# Patient Record
Sex: MALE | Race: OTHER | HISPANIC OR LATINO | URBAN - METROPOLITAN AREA
[De-identification: names, ages, dates, MRNs, and addresses within clinical notes are randomized per-mention and may not be internally consistent; named-entity substitution may affect disease eponyms.]

---

## 2018-08-01 ENCOUNTER — OUTPATIENT (OUTPATIENT)
Dept: OUTPATIENT SERVICES | Facility: HOSPITAL | Age: 63
LOS: 1 days | End: 2018-08-01
Payer: MEDICAID

## 2018-08-01 PROCEDURE — G9001: CPT

## 2018-08-16 DIAGNOSIS — Z71.89 OTHER SPECIFIED COUNSELING: ICD-10-CM

## 2018-11-06 ENCOUNTER — EMERGENCY (EMERGENCY)
Facility: HOSPITAL | Age: 63
LOS: 1 days | Discharge: HOME | End: 2018-11-06
Attending: EMERGENCY MEDICINE
Payer: MEDICAID

## 2018-11-06 VITALS
RESPIRATION RATE: 18 BRPM | OXYGEN SATURATION: 97 % | SYSTOLIC BLOOD PRESSURE: 122 MMHG | HEART RATE: 90 BPM | DIASTOLIC BLOOD PRESSURE: 71 MMHG

## 2018-11-06 VITALS — WEIGHT: 169.09 LBS

## 2018-11-06 LAB
ALBUMIN SERPL ELPH-MCNC: 3.4 G/DL — SIGNIFICANT CHANGE UP (ref 3.3–5.2)
ALP SERPL-CCNC: 60 U/L — SIGNIFICANT CHANGE UP (ref 40–120)
ALT FLD-CCNC: 10 U/L — SIGNIFICANT CHANGE UP
ANION GAP SERPL CALC-SCNC: 14 MMOL/L — SIGNIFICANT CHANGE UP (ref 5–17)
APPEARANCE UR: CLEAR — SIGNIFICANT CHANGE UP
AST SERPL-CCNC: 13 U/L — SIGNIFICANT CHANGE UP
BASOPHILS # BLD AUTO: 0 K/UL — SIGNIFICANT CHANGE UP (ref 0–0.2)
BASOPHILS NFR BLD AUTO: 0.2 % — SIGNIFICANT CHANGE UP (ref 0–2)
BILIRUB SERPL-MCNC: 0.3 MG/DL — LOW (ref 0.4–2)
BILIRUB UR-MCNC: NEGATIVE — SIGNIFICANT CHANGE UP
BUN SERPL-MCNC: 12 MG/DL — SIGNIFICANT CHANGE UP (ref 8–20)
CALCIUM SERPL-MCNC: 9.5 MG/DL — SIGNIFICANT CHANGE UP (ref 8.6–10.2)
CHLORIDE SERPL-SCNC: 98 MMOL/L — SIGNIFICANT CHANGE UP (ref 98–107)
CO2 SERPL-SCNC: 26 MMOL/L — SIGNIFICANT CHANGE UP (ref 22–29)
COLOR SPEC: YELLOW — SIGNIFICANT CHANGE UP
CREAT SERPL-MCNC: 0.72 MG/DL — SIGNIFICANT CHANGE UP (ref 0.5–1.3)
DIFF PNL FLD: NEGATIVE — SIGNIFICANT CHANGE UP
EOSINOPHIL # BLD AUTO: 0.2 K/UL — SIGNIFICANT CHANGE UP (ref 0–0.5)
EOSINOPHIL NFR BLD AUTO: 1.3 % — SIGNIFICANT CHANGE UP (ref 0–5)
GLUCOSE SERPL-MCNC: 222 MG/DL — HIGH (ref 70–115)
GLUCOSE UR QL: NEGATIVE MG/DL — SIGNIFICANT CHANGE UP
HCT VFR BLD CALC: 34 % — LOW (ref 42–52)
HGB BLD-MCNC: 11.6 G/DL — LOW (ref 14–18)
KETONES UR-MCNC: NEGATIVE — SIGNIFICANT CHANGE UP
LACTATE BLDV-MCNC: 1.9 MMOL/L — SIGNIFICANT CHANGE UP (ref 0.5–2)
LACTATE BLDV-MCNC: 2.1 MMOL/L — HIGH (ref 0.5–2)
LEUKOCYTE ESTERASE UR-ACNC: ABNORMAL
LYMPHOCYTES # BLD AUTO: 19.6 % — LOW (ref 20–55)
LYMPHOCYTES # BLD AUTO: 2.4 K/UL — SIGNIFICANT CHANGE UP (ref 1–4.8)
MCHC RBC-ENTMCNC: 25.8 PG — LOW (ref 27–31)
MCHC RBC-ENTMCNC: 34.1 G/DL — SIGNIFICANT CHANGE UP (ref 32–36)
MCV RBC AUTO: 75.7 FL — LOW (ref 80–94)
MONOCYTES # BLD AUTO: 1.1 K/UL — HIGH (ref 0–0.8)
MONOCYTES NFR BLD AUTO: 8.9 % — SIGNIFICANT CHANGE UP (ref 3–10)
NEUTROPHILS # BLD AUTO: 8.4 K/UL — HIGH (ref 1.8–8)
NEUTROPHILS NFR BLD AUTO: 68.3 % — SIGNIFICANT CHANGE UP (ref 37–73)
NITRITE UR-MCNC: NEGATIVE — SIGNIFICANT CHANGE UP
PH UR: 8 — SIGNIFICANT CHANGE UP (ref 5–8)
PLATELET # BLD AUTO: 404 K/UL — HIGH (ref 150–400)
POTASSIUM SERPL-MCNC: 4.7 MMOL/L — SIGNIFICANT CHANGE UP (ref 3.5–5.3)
POTASSIUM SERPL-SCNC: 4.7 MMOL/L — SIGNIFICANT CHANGE UP (ref 3.5–5.3)
PROT SERPL-MCNC: 7.1 G/DL — SIGNIFICANT CHANGE UP (ref 6.6–8.7)
PROT UR-MCNC: NEGATIVE MG/DL — SIGNIFICANT CHANGE UP
RBC # BLD: 4.49 M/UL — LOW (ref 4.6–6.2)
RBC # FLD: 15 % — SIGNIFICANT CHANGE UP (ref 11–15.6)
SODIUM SERPL-SCNC: 138 MMOL/L — SIGNIFICANT CHANGE UP (ref 135–145)
SP GR SPEC: 1.01 — SIGNIFICANT CHANGE UP (ref 1.01–1.02)
UROBILINOGEN FLD QL: 4 MG/DL
WBC # BLD: 12.3 K/UL — HIGH (ref 4.8–10.8)
WBC # FLD AUTO: 12.3 K/UL — HIGH (ref 4.8–10.8)

## 2018-11-06 PROCEDURE — 93005 ELECTROCARDIOGRAM TRACING: CPT

## 2018-11-06 PROCEDURE — 87040 BLOOD CULTURE FOR BACTERIA: CPT

## 2018-11-06 PROCEDURE — 36415 COLL VENOUS BLD VENIPUNCTURE: CPT

## 2018-11-06 PROCEDURE — 82550 ASSAY OF CK (CPK): CPT

## 2018-11-06 PROCEDURE — 93010 ELECTROCARDIOGRAM REPORT: CPT

## 2018-11-06 PROCEDURE — 84100 ASSAY OF PHOSPHORUS: CPT

## 2018-11-06 PROCEDURE — 86664 EPSTEIN-BARR NUCLEAR ANTIGEN: CPT

## 2018-11-06 PROCEDURE — 99283 EMERGENCY DEPT VISIT LOW MDM: CPT

## 2018-11-06 PROCEDURE — 86665 EPSTEIN-BARR CAPSID VCA: CPT

## 2018-11-06 PROCEDURE — 71046 X-RAY EXAM CHEST 2 VIEWS: CPT

## 2018-11-06 PROCEDURE — 85027 COMPLETE CBC AUTOMATED: CPT

## 2018-11-06 PROCEDURE — 86308 HETEROPHILE ANTIBODY SCREEN: CPT

## 2018-11-06 PROCEDURE — 86663 EPSTEIN-BARR ANTIBODY: CPT

## 2018-11-06 PROCEDURE — 83605 ASSAY OF LACTIC ACID: CPT

## 2018-11-06 PROCEDURE — 99285 EMERGENCY DEPT VISIT HI MDM: CPT

## 2018-11-06 PROCEDURE — 86618 LYME DISEASE ANTIBODY: CPT

## 2018-11-06 PROCEDURE — 81001 URINALYSIS AUTO W/SCOPE: CPT

## 2018-11-06 PROCEDURE — 71046 X-RAY EXAM CHEST 2 VIEWS: CPT | Mod: 26

## 2018-11-06 PROCEDURE — 87521 HEPATITIS C PROBE&RVRS TRNSC: CPT

## 2018-11-06 PROCEDURE — 80074 ACUTE HEPATITIS PANEL: CPT

## 2018-11-06 PROCEDURE — 83735 ASSAY OF MAGNESIUM: CPT

## 2018-11-06 PROCEDURE — 87086 URINE CULTURE/COLONY COUNT: CPT

## 2018-11-06 PROCEDURE — 80053 COMPREHEN METABOLIC PANEL: CPT

## 2018-11-06 PROCEDURE — 99284 EMERGENCY DEPT VISIT MOD MDM: CPT

## 2018-11-06 RX ORDER — METRONIDAZOLE 500 MG
1 TABLET ORAL
Qty: 30 | Refills: 0 | OUTPATIENT
Start: 2018-11-06 | End: 2018-11-15

## 2018-11-06 RX ORDER — SODIUM CHLORIDE 9 MG/ML
1000 INJECTION INTRAMUSCULAR; INTRAVENOUS; SUBCUTANEOUS ONCE
Qty: 0 | Refills: 0 | Status: COMPLETED | OUTPATIENT
Start: 2018-11-06 | End: 2018-11-06

## 2018-11-06 RX ORDER — MOXIFLOXACIN HYDROCHLORIDE TABLETS, 400 MG 400 MG/1
1 TABLET, FILM COATED ORAL
Qty: 20 | Refills: 0 | OUTPATIENT
Start: 2018-11-06 | End: 2018-11-15

## 2018-11-06 RX ADMIN — SODIUM CHLORIDE 1000 MILLILITER(S): 9 INJECTION INTRAMUSCULAR; INTRAVENOUS; SUBCUTANEOUS at 11:57

## 2018-11-06 RX ADMIN — SODIUM CHLORIDE 4000 MILLILITER(S): 9 INJECTION INTRAMUSCULAR; INTRAVENOUS; SUBCUTANEOUS at 14:29

## 2018-11-06 NOTE — ED ADULT NURSE REASSESSMENT NOTE - NS ED NURSE REASSESS COMMENT FT1
Pt remains a&ox3 resting comfortably with VSS, family at bedside, no acute s/s of respiratory distress noted or reported, pending stool sample collection, will continue to monitor

## 2018-11-06 NOTE — ED PROVIDER NOTE - NS ED ROS FT
ROS: no CP/SOB. no cough. no fever. no n/v/d/c. +abd pain. no rash. no bleeding. no urinary complaints. +weakness. no vision changes. no HA. no neck/back pain. no extremity swelling/deformity. No change in mental status.

## 2018-11-06 NOTE — ED ADULT NURSE NOTE - OBJECTIVE STATEMENT
pt reports diarrhea x 1 month, was away in [facility] a month ago and has been feeling this way ever since. states he drank the water there and thinks its from that. pt AOX3, no distress, no fevers. denies vomiting. no abd pain. no appetite change.

## 2018-11-06 NOTE — ED PROVIDER NOTE - PLAN OF CARE
1. Return to ED for worsening, progressive or any other concerning symptoms   2. Follow up with your primary care doctor in 2-3days   3. Take 500mg of Ciprofloxacin twice a day for 10days. Do not participate in any high impact activities while on this medication   4. Take 500mg of Metronidazole three times a day for 10days. Do not drink alcohol while on this medication

## 2018-11-06 NOTE — ED PROVIDER NOTE - OBJECTIVE STATEMENT
62yo M with return from [place] 1 month ago, approx 1 week after returning some 'loose stool' was started on flagyl and completed course. since 3 weeks ago severe generalized weakness and myalgias. no rash. some 'stomach upset' but denies any abd pain. no vomiting. no HA/neck stiffness. +chills and sweats at night, a 'light fever' only at night. denies bug bites during travel. wife with him there not sick. planned for travel to [place] tomorrow, took laxative last week to 'clear out boweels' had normal colored stool and had 'bugs' moving in the toilet. no worms. no CP/SOB. no syncope/presyncope. also feels b/l LE swollen.

## 2018-11-06 NOTE — ED PROVIDER NOTE - MEDICAL DECISION MAKING DETAILS
patient with myalgias, possible fevers at night and fatigue since return from [place] 3-4 weeks, hd loose stool previously then resolved. now with 1 episode loose stool after laxative with 'bugs' patient showed video there are fluttering movements/circular unable to distinguish a describe entity, no worms. exam unremarkable except for tachycradia on minimal exertion. will check labs. hepatits. ebv/cmv. stool studies even though those collected on 10/30 negative. possible viral illness. typhoid. malaria. will send thick/thin smear and possible ID consult patient with myalgias, possible fevers at night and fatigue since return from [place] 3-4 weeks, hd loose stool previously then resolved. now with 1 episode loose stool after laxative with 'bugs' patient showed video there are fluttering movements/circular unable to distinguish a describe entity, no worms. exam unremarkable except for tachycradia on minimal exertion. will check labs. hepatits. ebv/cmv. stool studies even though those collected on 10/30 negative. possible viral illness. typhoid. malaria. will send thick/thin smear and possible ID consult. had mentioend LE swelling- no swelling on exam, no CP/SOB no concern for DVTY/PE

## 2018-11-06 NOTE — ED STATDOCS - PROGRESS NOTE DETAILS
patient re-evaluated c/o b/l leg swelling, recently traveled from [place], for 1 month, not feeling well, found to have tachycardia in office and sent for outpatient labs and stool studies, treated with Flagyl for suspected parasite infection and diarhea, CXR shows no e/o CHF, patient only pmhx DM, EKG shows tachycardia with PVCs, PE +b/l leg swelling, upgraded to MAIN for cardiac monitor, case d/w MD Bowden for further evaluation

## 2018-11-06 NOTE — ED PROVIDER NOTE - CARE PLAN
Principal Discharge DX:	Myalgia  Assessment and plan of treatment:	1. Return to ED for worsening, progressive or any other concerning symptoms   2. Follow up with your primary care doctor in 2-3days   3. Take 500mg of Ciprofloxacin twice a day for 10days. Do not participate in any high impact activities while on this medication   4. Take 500mg of Metronidazole three times a day for 10days. Do not drink alcohol while on this medication  Secondary Diagnosis:	Chills

## 2018-11-06 NOTE — ED PROVIDER NOTE - NOTES
Dr Hadley, recommend C/F for 10 days and Follow up outpt. no concerning exam findings. afebrile. diarrhea resolved but with possible mobile agents in toilet will tx again.

## 2018-11-06 NOTE — CONSULT NOTE ADULT - ASSESSMENT
64y/o man with HTN and DM came to ED with generalized body aches for few days and intermittent loose BM and constipation.   Doesn't appear sick, no fever, mild leukocytosis. he could have mild colitis or parasitic infection. Leaving for [place] tomorrow and coming back on 11/16.   Advised him to see a GI for possible colonoscopy and more investigation. Reportedly had a neg O&P but needs to be repeated.   Will give him 10days of cirpo 500mg q12h and metronidazole 500mg q8h. If problem continues will see me in the office for more work up.  At any time if feels worse with new symptoms or fever will go to ED.   Discussed with the patient and ED attending.

## 2018-11-06 NOTE — ED PROVIDER NOTE - PHYSICAL EXAMINATION
Gen: NAD, AOx3  Head: NCAT  HEENT: PERRL, EOMI, oral mucosa moist, normal conjunctiva, neck supple  Lung: CTAB, no respiratory distress  CV: rrr, no murmur, Normal perfusion  Abd: soft, NTND  MSK: No edema, no visible deformities  Neuro: No focal neurologic deficits  Skin: No rash   Psych: normal affect

## 2018-11-06 NOTE — ED ADULT TRIAGE NOTE - CHIEF COMPLAINT QUOTE
"I went on vacation in September and since then I have been weak since with diarrhea. I saw the doctor and was on medication, I saw them today and they told me to come here for parasites. " Pt A & OX4. has papers from [facility].

## 2018-11-06 NOTE — ED PROVIDER NOTE - PROGRESS NOTE DETAILS
mild leukocytosis, thick/thin smear no parasites visualized. will need likely doxy vs cipro will Follow up ID consult -Kal DO

## 2018-11-07 LAB
B BURGDOR C6 AB SER-ACNC: NEGATIVE — SIGNIFICANT CHANGE UP
B BURGDOR IGG+IGM SER-ACNC: 0.09 INDEX — SIGNIFICANT CHANGE UP (ref 0.01–0.89)
CULTURE RESULTS: SIGNIFICANT CHANGE UP
EBV EA AB SER IA-ACNC: <5 U/ML — SIGNIFICANT CHANGE UP
EBV EA AB TITR SER IF: POSITIVE
EBV EA IGG SER-ACNC: NEGATIVE — SIGNIFICANT CHANGE UP
EBV NA IGG SER IA-ACNC: 29.7 U/ML — HIGH
EBV PATRN SPEC IB-IMP: SIGNIFICANT CHANGE UP
EBV VCA IGG AVIDITY SER QL IA: POSITIVE
EBV VCA IGM SER IA-ACNC: 225 U/ML — HIGH
EBV VCA IGM SER IA-ACNC: <10 U/ML — SIGNIFICANT CHANGE UP
EBV VCA IGM TITR FLD: NEGATIVE — SIGNIFICANT CHANGE UP
HAV IGM SER-ACNC: SIGNIFICANT CHANGE UP
HBV CORE IGM SER-ACNC: SIGNIFICANT CHANGE UP
HBV SURFACE AG SER-ACNC: SIGNIFICANT CHANGE UP
HCV AB S/CO SERPL IA: 6.22 S/CO — SIGNIFICANT CHANGE UP
HCV AB SERPL-IMP: REACTIVE
HCV RNA FLD QL NAA+PROBE: SIGNIFICANT CHANGE UP
HCV RNA SPEC QL PROBE+SIG AMP: SIGNIFICANT CHANGE UP
SPECIMEN SOURCE: SIGNIFICANT CHANGE UP

## 2018-11-09 RX ORDER — LOSARTAN POTASSIUM 100 MG/1
0 TABLET, FILM COATED ORAL
Qty: 0 | Refills: 0 | COMMUNITY

## 2018-11-09 RX ORDER — ASPIRIN/CALCIUM CARB/MAGNESIUM 324 MG
1 TABLET ORAL
Qty: 0 | Refills: 0 | COMMUNITY

## 2018-11-09 RX ORDER — METFORMIN HYDROCHLORIDE 850 MG/1
0 TABLET ORAL
Qty: 0 | Refills: 0 | COMMUNITY

## 2018-11-11 LAB
CULTURE RESULTS: SIGNIFICANT CHANGE UP
CULTURE RESULTS: SIGNIFICANT CHANGE UP
SPECIMEN SOURCE: SIGNIFICANT CHANGE UP
SPECIMEN SOURCE: SIGNIFICANT CHANGE UP

## 2019-04-19 PROBLEM — I10 ESSENTIAL (PRIMARY) HYPERTENSION: Chronic | Status: ACTIVE | Noted: 2018-11-06

## 2019-04-19 PROBLEM — E11.9 TYPE 2 DIABETES MELLITUS WITHOUT COMPLICATIONS: Chronic | Status: ACTIVE | Noted: 2018-11-06

## 2019-08-30 ENCOUNTER — APPOINTMENT (OUTPATIENT)
Dept: FAMILY MEDICINE | Facility: CLINIC | Age: 64
End: 2019-08-30

## 2021-04-08 NOTE — ED STATDOCS - OBJECTIVE STATEMENT
Schedule Procedure:   Please Schedule October 2021  Procedure: Colonoscopy (40444) with NuLytely  Diagnosis: Personal History Colon Cancer Z85.038  Is patient:    Diabetic? Yes: Hold oral medications day of procedure   On Coumadin, heparin, lovenox? Yes: Coumadin Discuss with Prescribing Provider.   On ASA/NSAIDS? Platelet Modifying (examples - Plavix, aspirin, nsaids, Aggrenox)? No  Latex allergy: No  Sleep apnea: No  [facility]  Special Instructions:   MAC Anesthesia        Dr. Snell sent reminder for colonoscopy due in October 2021.  Per Dr. Crespo's note above, patient will decide if he wants to pursue this.  COVID test not ordered yet.  Will need to get clearance to hold Coumadin as well.           Telemedicine assessment was conducted (using real time 2 way audio-video technology) by Dr. Gaurav Ardon located at [address] [place] [id number]  ++++++++++++++++++++++++  Pertinent patient history and initial plan:   64 y/o M pt presents to ED c/o weakness, leg swelling, and body aches x 6 weeks. Went to [place] came back September 24th. Two days later, pt began having body aches and leg swelling. He states he took a laxative 4 days ago and had BM and saw something move in the toilet. He believes it was a parasite. Pt was given antibiotics the week he returned for diarrhea. Denies vomiting. Pt has plans to go to [place] tomorrow. Telemedicine assessment was conducted (using real time 2 way audio-video technology) by Dr. Gaurav Ardon located at [address] [place] [id number]  ++++++++++++++++++++++++  Pertinent patient history and initial plan:   62 y/o M pt presents to ED c/o weakness, leg swelling, and body aches x 6 weeks. Went to [place] came back September 24th. Two days later, pt began having body aches and leg swelling. He states he took a laxative 4 days ago and had BM and saw something move in the toilet. He believes it was a parasite. Pt was given antibiotics the week he returned for diarrhea. Denies vomiting. Pt has plans to go to [place] tomorrow.  patient went to pmd Dr. Macias today and was referred to ED  patient had stool culture and O&P performed by pmd - and has results which are negative  patient reports he is starting to feel better  denies chest pain or shortness of breath  denies abdominal pain  +edema to b/l feet x 1 week    will recheck cbc, lytes, lft  cxr    Patient seen by me in intake for initial assessment and ordering. Physician on site to follow results and further evaluate and treat patient.

## 2021-10-08 ENCOUNTER — TRANSCRIPTION ENCOUNTER (OUTPATIENT)
Age: 66
End: 2021-10-08

## 2022-11-10 ENCOUNTER — OFFICE (OUTPATIENT)
Dept: URBAN - METROPOLITAN AREA CLINIC 112 | Facility: CLINIC | Age: 67
Setting detail: OPHTHALMOLOGY
End: 2022-11-10
Payer: COMMERCIAL

## 2022-11-10 DIAGNOSIS — H52.4: ICD-10-CM

## 2022-11-10 DIAGNOSIS — H25.12: ICD-10-CM

## 2022-11-10 DIAGNOSIS — H40.1131: ICD-10-CM

## 2022-11-10 DIAGNOSIS — E11.3393: ICD-10-CM

## 2022-11-10 PROCEDURE — 92014 COMPRE OPH EXAM EST PT 1/>: CPT | Performed by: OPHTHALMOLOGY

## 2022-11-10 PROCEDURE — 92015 DETERMINE REFRACTIVE STATE: CPT | Performed by: OPHTHALMOLOGY

## 2022-11-10 PROCEDURE — 92083 EXTENDED VISUAL FIELD XM: CPT | Performed by: OPHTHALMOLOGY

## 2022-11-10 PROCEDURE — 92133 CPTRZD OPH DX IMG PST SGM ON: CPT | Performed by: OPHTHALMOLOGY

## 2022-11-10 ASSESSMENT — TONOMETRY
OS_IOP_MMHG: 15
OS_IOP_MMHG: 16
OD_IOP_MMHG: 16
OD_IOP_MMHG: 12

## 2022-11-10 ASSESSMENT — REFRACTION_MANIFEST
OS_SPHERE: +0.75
OD_VA1: 20/25
OS_AXIS: 050
OD_SPHERE: +1.00
OD_ADD: +2.75
OS_CYLINDER: -1.00
OD_AXIS: 041
OU_VA: 20/20
OS_VA1: 20/20
OS_VA2: 20/20
OD_VA2: 20/20
OS_ADD: +2.75
OD_CYLINDER: -0.25

## 2022-11-10 ASSESSMENT — REFRACTION_AUTOREFRACTION
OS_SPHERE: +0.75
OS_CYLINDER: -1.25
OS_AXIS: 053
OD_SPHERE: +2.50
OD_CYLINDER: -0.25
OD_AXIS: 041

## 2022-11-10 ASSESSMENT — AXIALLENGTH_DERIVED
OS_AL: 23.1831
OD_AL: 23.8977
OD_AL: 23.3127
OS_AL: 23.1362

## 2022-11-10 ASSESSMENT — PACHYMETRY
OD_CT_UM: 500
OS_CT_UM: 534
OD_CT_CORRECTION: 3
OS_CT_CORRECTION: 1

## 2022-11-10 ASSESSMENT — SPHEQUIV_DERIVED
OD_SPHEQUIV: 2.375
OS_SPHEQUIV: 0.25
OD_SPHEQUIV: 0.875
OS_SPHEQUIV: 0.125

## 2022-11-10 ASSESSMENT — LID POSITION - PTOSIS
OD_PTOSIS: ABSENT
OS_PTOSIS: ABSENT

## 2022-11-10 ASSESSMENT — KERATOMETRY
OD_AXISANGLE_DEGREES: 050
OS_K2POWER_DIOPTERS: 44.75
METHOD_AUTO_MANUAL: AUTO
OS_AXISANGLE_DEGREES: 135
OD_K2POWER_DIOPTERS: 42.00
OD_K1POWER_DIOPTERS: 41.50
OS_K1POWER_DIOPTERS: 44.25

## 2022-11-10 ASSESSMENT — CONFRONTATIONAL VISUAL FIELD TEST (CVF)
OS_FINDINGS: FULL
OD_FINDINGS: FULL

## 2022-11-10 ASSESSMENT — CORNEAL SURGICAL SCARRING: OD_SCARRING: ANTERIOR

## 2022-11-10 ASSESSMENT — VISUAL ACUITY
OD_BCVA: 20/25-1
OS_BCVA: 20/25-1

## 2023-01-03 ENCOUNTER — OFFICE (OUTPATIENT)
Dept: URBAN - METROPOLITAN AREA CLINIC 94 | Facility: CLINIC | Age: 68
Setting detail: OPHTHALMOLOGY
End: 2023-01-03
Payer: COMMERCIAL

## 2023-01-03 DIAGNOSIS — E11.3393: ICD-10-CM

## 2023-01-03 DIAGNOSIS — H43.813: ICD-10-CM

## 2023-01-03 DIAGNOSIS — H35.033: ICD-10-CM

## 2023-01-03 PROCEDURE — 92134 CPTRZ OPH DX IMG PST SGM RTA: CPT | Performed by: OPHTHALMOLOGY

## 2023-01-03 PROCEDURE — 92014 COMPRE OPH EXAM EST PT 1/>: CPT | Performed by: OPHTHALMOLOGY

## 2023-01-03 PROCEDURE — 92235 FLUORESCEIN ANGRPH MLTIFRAME: CPT | Performed by: OPHTHALMOLOGY

## 2023-01-03 ASSESSMENT — AXIALLENGTH_DERIVED
OD_AL: 23.3127
OS_AL: 23.1831

## 2023-01-03 ASSESSMENT — PACHYMETRY
OS_CT_UM: 534
OD_CT_UM: 500
OD_CT_CORRECTION: 3
OS_CT_CORRECTION: 1

## 2023-01-03 ASSESSMENT — KERATOMETRY
OD_K2POWER_DIOPTERS: 42.00
OS_AXISANGLE_DEGREES: 135
METHOD_AUTO_MANUAL: AUTO
OS_K1POWER_DIOPTERS: 44.25
OD_K1POWER_DIOPTERS: 41.50
OD_AXISANGLE_DEGREES: 050
OS_K2POWER_DIOPTERS: 44.75

## 2023-01-03 ASSESSMENT — REFRACTION_AUTOREFRACTION
OS_CYLINDER: -1.25
OD_AXIS: 041
OD_SPHERE: +2.50
OS_SPHERE: +0.75
OD_CYLINDER: -0.25
OS_AXIS: 053

## 2023-01-03 ASSESSMENT — LID POSITION - PTOSIS
OS_PTOSIS: ABSENT
OD_PTOSIS: ABSENT

## 2023-01-03 ASSESSMENT — TONOMETRY
OS_IOP_MMHG: 16
OD_IOP_MMHG: 11

## 2023-01-03 ASSESSMENT — VISUAL ACUITY
OD_BCVA: 20/25-1
OS_BCVA: 20/30

## 2023-01-03 ASSESSMENT — SPHEQUIV_DERIVED
OS_SPHEQUIV: 0.125
OD_SPHEQUIV: 2.375

## 2023-01-03 ASSESSMENT — CORNEAL SURGICAL SCARRING: OD_SCARRING: ANTERIOR

## 2023-01-03 ASSESSMENT — CONFRONTATIONAL VISUAL FIELD TEST (CVF)
OS_FINDINGS: FULL
OD_FINDINGS: FULL

## 2023-06-22 ENCOUNTER — OFFICE (OUTPATIENT)
Dept: URBAN - METROPOLITAN AREA CLINIC 112 | Facility: CLINIC | Age: 68
Setting detail: OPHTHALMOLOGY
End: 2023-06-22
Payer: MEDICARE

## 2023-06-22 DIAGNOSIS — H43.813: ICD-10-CM

## 2023-06-22 DIAGNOSIS — E11.3393: ICD-10-CM

## 2023-06-22 DIAGNOSIS — H04.123: ICD-10-CM

## 2023-06-22 DIAGNOSIS — H25.12: ICD-10-CM

## 2023-06-22 DIAGNOSIS — H35.033: ICD-10-CM

## 2023-06-22 DIAGNOSIS — H40.1131: ICD-10-CM

## 2023-06-22 PROCEDURE — 92083 EXTENDED VISUAL FIELD XM: CPT | Performed by: OPHTHALMOLOGY

## 2023-06-22 PROCEDURE — 92250 FUNDUS PHOTOGRAPHY W/I&R: CPT | Performed by: OPHTHALMOLOGY

## 2023-06-22 PROCEDURE — 92014 COMPRE OPH EXAM EST PT 1/>: CPT | Performed by: OPHTHALMOLOGY

## 2023-06-22 ASSESSMENT — KERATOMETRY
METHOD_AUTO_MANUAL: AUTO
OS_K1POWER_DIOPTERS: 44.25
OD_AXISANGLE_DEGREES: 041
OD_K2POWER_DIOPTERS: 41.75
OS_AXISANGLE_DEGREES: 142
OD_K1POWER_DIOPTERS: 41.00
OS_K2POWER_DIOPTERS: 44.75

## 2023-06-22 ASSESSMENT — LID POSITION - PTOSIS
OD_PTOSIS: ABSENT
OS_PTOSIS: ABSENT

## 2023-06-22 ASSESSMENT — CONFRONTATIONAL VISUAL FIELD TEST (CVF)
OS_FINDINGS: FULL
OD_FINDINGS: FULL

## 2023-06-22 ASSESSMENT — REFRACTION_AUTOREFRACTION
OD_SPHERE: +2.50
OS_AXIS: 061
OS_SPHERE: +0.75
OD_CYLINDER: -0.75
OD_AXIS: 022
OS_CYLINDER: -1.50

## 2023-06-22 ASSESSMENT — VISUAL ACUITY
OS_BCVA: 20/40+1
OD_BCVA: 20/40

## 2023-06-22 ASSESSMENT — AXIALLENGTH_DERIVED
OS_AL: 23.2302
OD_AL: 23.5446

## 2023-06-22 ASSESSMENT — PACHYMETRY
OD_CT_UM: 500
OS_CT_CORRECTION: 1
OD_CT_CORRECTION: 3
OS_CT_UM: 534

## 2023-06-22 ASSESSMENT — SPHEQUIV_DERIVED
OS_SPHEQUIV: 0
OD_SPHEQUIV: 2.125

## 2023-06-22 ASSESSMENT — TONOMETRY
OD_IOP_MMHG: 16
OS_IOP_MMHG: 18

## 2023-06-22 ASSESSMENT — CORNEAL SURGICAL SCARRING: OD_SCARRING: ANTERIOR

## 2023-08-29 ENCOUNTER — OFFICE (OUTPATIENT)
Dept: URBAN - METROPOLITAN AREA CLINIC 94 | Facility: CLINIC | Age: 68
Setting detail: OPHTHALMOLOGY
End: 2023-08-29
Payer: MEDICARE

## 2023-08-29 DIAGNOSIS — H43.813: ICD-10-CM

## 2023-08-29 DIAGNOSIS — H35.033: ICD-10-CM

## 2023-08-29 DIAGNOSIS — E11.3393: ICD-10-CM

## 2023-08-29 PROCEDURE — 92235 FLUORESCEIN ANGRPH MLTIFRAME: CPT | Performed by: OPHTHALMOLOGY

## 2023-08-29 PROCEDURE — 92014 COMPRE OPH EXAM EST PT 1/>: CPT | Performed by: OPHTHALMOLOGY

## 2023-08-29 PROCEDURE — 92134 CPTRZ OPH DX IMG PST SGM RTA: CPT | Performed by: OPHTHALMOLOGY

## 2023-08-29 ASSESSMENT — KERATOMETRY
OS_K2POWER_DIOPTERS: 44.75
OS_K1POWER_DIOPTERS: 44.25
OD_K1POWER_DIOPTERS: 41.00
OS_AXISANGLE_DEGREES: 142
METHOD_AUTO_MANUAL: AUTO
OD_K2POWER_DIOPTERS: 41.75
OD_AXISANGLE_DEGREES: 041

## 2023-08-29 ASSESSMENT — REFRACTION_AUTOREFRACTION
OS_CYLINDER: -1.50
OS_AXIS: 061
OD_CYLINDER: -0.75
OD_SPHERE: +2.50
OD_AXIS: 022
OS_SPHERE: +0.75

## 2023-08-29 ASSESSMENT — SPHEQUIV_DERIVED
OD_SPHEQUIV: 2.125
OS_SPHEQUIV: 0

## 2023-08-29 ASSESSMENT — AXIALLENGTH_DERIVED
OS_AL: 23.2302
OD_AL: 23.5446

## 2023-08-29 ASSESSMENT — VISUAL ACUITY
OS_BCVA: 20/40+
OD_BCVA: 20/40+

## 2023-08-29 ASSESSMENT — TONOMETRY: OD_IOP_MMHG: 19

## 2023-08-29 ASSESSMENT — LID POSITION - PTOSIS
OS_PTOSIS: ABSENT
OD_PTOSIS: ABSENT

## 2023-08-29 ASSESSMENT — PACHYMETRY
OD_CT_CORRECTION: 3
OS_CT_UM: 534
OD_CT_UM: 500
OS_CT_CORRECTION: 1

## 2023-08-29 ASSESSMENT — CONFRONTATIONAL VISUAL FIELD TEST (CVF)
OD_FINDINGS: FULL
OS_FINDINGS: FULL

## 2023-08-29 ASSESSMENT — CORNEAL SURGICAL SCARRING: OD_SCARRING: ANTERIOR

## 2023-10-03 ENCOUNTER — OFFICE (OUTPATIENT)
Dept: URBAN - METROPOLITAN AREA CLINIC 94 | Facility: CLINIC | Age: 68
Setting detail: OPHTHALMOLOGY
End: 2023-10-03
Payer: MEDICARE

## 2023-10-03 DIAGNOSIS — H04.123: ICD-10-CM

## 2023-10-03 DIAGNOSIS — H02.423: ICD-10-CM

## 2023-10-03 DIAGNOSIS — H02.524: ICD-10-CM

## 2023-10-03 DIAGNOSIS — H02.521: ICD-10-CM

## 2023-10-03 PROBLEM — H02.422 PTOSIS MYOGENIC; RIGHT EYE, LEFT EYE, BOTH EYES: Status: ACTIVE | Noted: 2023-10-03

## 2023-10-03 PROBLEM — H02.421 PTOSIS MYOGENIC; RIGHT EYE, LEFT EYE, BOTH EYES: Status: ACTIVE | Noted: 2023-10-03

## 2023-10-03 PROCEDURE — 83861 MICROFLUID ANALY TEARS: CPT | Mod: QW | Performed by: OPHTHALMOLOGY

## 2023-10-03 PROCEDURE — 92082 INTERMEDIATE VISUAL FIELD XM: CPT | Performed by: OPHTHALMOLOGY

## 2023-10-03 PROCEDURE — 92285 EXTERNAL OCULAR PHOTOGRAPHY: CPT | Performed by: OPHTHALMOLOGY

## 2023-10-03 PROCEDURE — 99214 OFFICE O/P EST MOD 30 MIN: CPT | Performed by: OPHTHALMOLOGY

## 2023-10-03 ASSESSMENT — REFRACTION_AUTOREFRACTION
OD_CYLINDER: -0.75
OD_AXIS: 022
OS_SPHERE: +0.75
OD_SPHERE: +2.50
OS_CYLINDER: -1.50
OS_AXIS: 061

## 2023-10-03 ASSESSMENT — KERATOMETRY
OS_K2POWER_DIOPTERS: 44.75
OD_AXISANGLE_DEGREES: 041
OS_AXISANGLE_DEGREES: 142
OD_K1POWER_DIOPTERS: 41.00
METHOD_AUTO_MANUAL: AUTO
OD_K2POWER_DIOPTERS: 41.75
OS_K1POWER_DIOPTERS: 44.25

## 2023-10-03 ASSESSMENT — AXIALLENGTH_DERIVED
OD_AL: 23.5446
OS_AL: 23.2302

## 2023-10-03 ASSESSMENT — PACHYMETRY
OS_CT_UM: 534
OS_CT_CORRECTION: 1
OD_CT_CORRECTION: 3
OD_CT_UM: 500

## 2023-10-03 ASSESSMENT — SPHEQUIV_DERIVED
OD_SPHEQUIV: 2.125
OS_SPHEQUIV: 0

## 2023-10-03 ASSESSMENT — TONOMETRY: OD_IOP_MMHG: 16

## 2023-10-03 ASSESSMENT — CONFRONTATIONAL VISUAL FIELD TEST (CVF)
OD_FINDINGS: FULL
OS_FINDINGS: FULL

## 2023-10-03 ASSESSMENT — VISUAL ACUITY
OS_BCVA: 20/30-1
OD_BCVA: 20/20-1

## 2023-10-03 ASSESSMENT — CORNEAL SURGICAL SCARRING: OD_SCARRING: ANTERIOR

## 2023-11-04 NOTE — CONSULT NOTE ADULT - SUBJECTIVE AND OBJECTIVE BOX
[facility]  INFECTIOUS DISEASES AND INTERNAL MEDICINE at [place]  =======================================================  Rogerio Bejarano MD  Diplomates American Board of Internal Medicine and Infectious Diseases  =======================================================    [id number]  ELIDIA ROBERTSON     CC: Abdominal discomfort, leg swelling and loose stool   HPI: 64y/o man with HTN and DM came to ED with generalized body aches for few days. He was in [place] about one month ago, since his return had intermittent loose BM and sometimes constipation. Once after taking laxative noticed some movement in toilet bowel(he had a video showing movement but no organism seen). No fever or chills but for the last few days has generalized body pain. Also had intermittent leg swelling that are not present at this time. Minimal abdominal discomfort.   He is leaving for  tomorrow. No one was sick after return from [place], no sick contact. He had local food over there.     PAST MEDICAL & SURGICAL HISTORY:  HTN (hypertension)  DM (diabetes mellitus)  No significant past surgical history    FAMILY HISTORY:  No pertinent family history in first degree relatives    Allergies  No Known Allergies    Antibiotics:  None     REVIEW OF SYSTEMS:  CONSTITUTIONAL:  No Fever or chills  HEENT:  No diplopia or blurred vision.  No sore throat or runny nose.  CARDIOVASCULAR:  No chest pain or SOB.  RESPIRATORY:  No cough, shortness of breath, PND or orthopnea.  GASTROINTESTINAL:  No nausea, vomiting or diarrhea. intermittent loose BM and constipation   GENITOURINARY:  No dysuria, frequency or urgency. No Blood in urine  MUSCULOSKELETAL:  body pain   SKIN:  No change in skin, hair or nails.  NEUROLOGIC:  No paresthesias, fasciculations, seizures or weakness.  PSYCHIATRIC:  No disorder of thought or mood.  ENDOCRINE:  No heat or cold intolerance, polyuria or polydipsia.  HEMATOLOGICAL:  No easy bruising or bleeding.     Physical Exam:  Vital Signs Last 24 Hrs  T(C): 36.6 (2018 10:19), Max: 36.6 (2018 10:19)  T(F): 97.9 (2018 10:19), Max: 97.9 (2018 10:19)  HR: 94 (2018 14:59) (94 - 112)  BP: 115/79 (2018 14:59) (115/79 - 122/83)  RR: 18 (2018 14:59) (18 - 18)  SpO2: 96% (2018 14:59) (96% - 96%)  Weight (kg): 76.7 ( @ 10:13)  GEN: NAD  HEENT: normocephalic and atraumatic. EOMI. PERRL.    NECK: Supple.  No lymphadenopathy   LUNGS: Clear to auscultation.  HEART: Regular rate and rhythm without murmur.  ABDOMEN: Soft, nontender, and nondistended.  Positive bowel sounds.    : No CVA tenderness  EXTREMITIES: Without any cyanosis, clubbing, rash, lesions or edema.  NEUROLOGIC: grossly intact.  PSYCHIATRIC: Appropriate affect .  SKIN: No ulceration or induration present.    Labs:      138  |  98  |  12.0  ----------------------------<  222<H>  4.7   |  26.0  |  0.72    Ca    9.5      2018 11:03  Phos  3.0       Mg     1.9         TPro  7.1  /  Alb  3.4  /  TBili  0.3<L>  /  DBili  x   /  AST  13  /  ALT  10  /  AlkPhos  60                            11.6   12.3  )-----------( 404      ( 2018 11:03 )             34.0     Urinalysis Basic - ( 2018 15:01 )    Color: Yellow / Appearance: Clear / S.010 / pH: x  Gluc: x / Ketone: Negative  / Bili: Negative / Urobili: 4 mg/dL   Blood: x / Protein: Negative mg/dL / Nitrite: Negative   Leuk Esterase: Trace / RBC: 0-2 /HPF / WBC 0-2   Sq Epi: x / Non Sq Epi: Occasional / Bacteria: Occasional    LIVER FUNCTIONS - ( 2018 11:03 )  Alb: 3.4 g/dL / Pro: 7.1 g/dL / ALK PHOS: 60 U/L / ALT: 10 U/L / AST: 13 U/L / GGT: x           CARDIAC MARKERS ( 2018 11:03 )  x     / x     / 62 U/L / x     / x          RECENT CULTURES:  None    All imaging and other data have been reviewed.  CXR negative for any pathology
Resident

## 2023-11-06 ENCOUNTER — ASC (OUTPATIENT)
Dept: URBAN - METROPOLITAN AREA SURGERY 8 | Facility: SURGERY | Age: 68
Setting detail: OPHTHALMOLOGY
End: 2023-11-06
Payer: MEDICARE

## 2023-11-06 DIAGNOSIS — H02.521: ICD-10-CM

## 2023-11-06 DIAGNOSIS — H02.422: ICD-10-CM

## 2023-11-06 DIAGNOSIS — H02.524: ICD-10-CM

## 2023-11-06 DIAGNOSIS — H02.421: ICD-10-CM

## 2023-11-06 PROCEDURE — 67904 REPAIR EYELID DEFECT: CPT | Mod: 50 | Performed by: OPHTHALMOLOGY

## 2023-11-06 PROCEDURE — 67900 REPAIR BROW DEFECT: CPT | Mod: 50 | Performed by: OPHTHALMOLOGY

## 2023-11-07 ENCOUNTER — RX ONLY (RX ONLY)
Age: 68
End: 2023-11-07

## 2023-11-07 ENCOUNTER — OFFICE (OUTPATIENT)
Dept: URBAN - METROPOLITAN AREA CLINIC 94 | Facility: CLINIC | Age: 68
Setting detail: OPHTHALMOLOGY
End: 2023-11-07
Payer: MEDICARE

## 2023-11-07 DIAGNOSIS — H02.521: ICD-10-CM

## 2023-11-07 DIAGNOSIS — H02.423: ICD-10-CM

## 2023-11-07 DIAGNOSIS — H02.421: ICD-10-CM

## 2023-11-07 DIAGNOSIS — H02.524: ICD-10-CM

## 2023-11-07 DIAGNOSIS — H02.422: ICD-10-CM

## 2023-11-07 PROCEDURE — 99024 POSTOP FOLLOW-UP VISIT: CPT | Performed by: OPHTHALMOLOGY

## 2023-11-07 ASSESSMENT — REFRACTION_AUTOREFRACTION
OS_SPHERE: +0.75
OD_AXIS: 022
OD_SPHERE: +2.50
OS_AXIS: 061
OD_CYLINDER: -0.75
OS_CYLINDER: -1.50

## 2023-11-07 ASSESSMENT — SPHEQUIV_DERIVED
OD_SPHEQUIV: 2.125
OS_SPHEQUIV: 0

## 2023-11-07 ASSESSMENT — CORNEAL SURGICAL SCARRING: OD_SCARRING: ANTERIOR

## 2023-11-30 PROBLEM — H02.421 PTOSIS MYOGENIC; RIGHT EYE, LEFT EYE, BOTH EYES: Status: ACTIVE | Noted: 2023-11-07

## 2023-11-30 PROBLEM — H02.422 PTOSIS MYOGENIC; RIGHT EYE, LEFT EYE, BOTH EYES: Status: ACTIVE | Noted: 2023-11-07

## 2023-11-30 PROBLEM — H02.423 PTOSIS MYOGENIC; RIGHT EYE, LEFT EYE, BOTH EYES: Status: ACTIVE | Noted: 2023-11-07

## 2023-12-19 ENCOUNTER — OFFICE (OUTPATIENT)
Dept: URBAN - METROPOLITAN AREA CLINIC 112 | Facility: CLINIC | Age: 68
Setting detail: OPHTHALMOLOGY
End: 2023-12-19
Payer: MEDICARE

## 2023-12-19 DIAGNOSIS — H35.033: ICD-10-CM

## 2023-12-19 DIAGNOSIS — H25.12: ICD-10-CM

## 2023-12-19 DIAGNOSIS — H04.123: ICD-10-CM

## 2023-12-19 DIAGNOSIS — H40.1131: ICD-10-CM

## 2023-12-19 PROCEDURE — 92014 COMPRE OPH EXAM EST PT 1/>: CPT | Mod: 24 | Performed by: OPHTHALMOLOGY

## 2023-12-19 PROCEDURE — 92133 CPTRZD OPH DX IMG PST SGM ON: CPT | Performed by: OPHTHALMOLOGY

## 2023-12-19 ASSESSMENT — CORNEAL SURGICAL SCARRING: OD_SCARRING: ANTERIOR

## 2023-12-19 ASSESSMENT — REFRACTION_AUTOREFRACTION
OD_SPHERE: +3.25
OS_SPHERE: +1.50
OD_CYLINDER: -1.00
OD_AXIS: 051
OS_CYLINDER: -1.75
OS_AXIS: 059

## 2023-12-19 ASSESSMENT — SPHEQUIV_DERIVED
OD_SPHEQUIV: 2.75
OS_SPHEQUIV: 0.625

## 2023-12-19 ASSESSMENT — CONFRONTATIONAL VISUAL FIELD TEST (CVF)
OD_FINDINGS: FULL
OS_FINDINGS: FULL

## 2024-04-23 ENCOUNTER — OFFICE (OUTPATIENT)
Dept: URBAN - METROPOLITAN AREA CLINIC 94 | Facility: CLINIC | Age: 69
Setting detail: OPHTHALMOLOGY
End: 2024-04-23
Payer: MEDICARE

## 2024-04-23 DIAGNOSIS — E11.3393: ICD-10-CM

## 2024-04-23 PROCEDURE — 92235 FLUORESCEIN ANGRPH MLTIFRAME: CPT | Performed by: OPHTHALMOLOGY

## 2024-04-23 PROCEDURE — 92134 CPTRZ OPH DX IMG PST SGM RTA: CPT | Performed by: OPHTHALMOLOGY

## 2024-04-23 PROCEDURE — 92012 INTRM OPH EXAM EST PATIENT: CPT | Performed by: OPHTHALMOLOGY

## 2024-07-02 ENCOUNTER — OFFICE (OUTPATIENT)
Dept: URBAN - METROPOLITAN AREA CLINIC 112 | Facility: CLINIC | Age: 69
Setting detail: OPHTHALMOLOGY
End: 2024-07-02
Payer: MEDICARE

## 2024-07-02 DIAGNOSIS — H40.1131: ICD-10-CM

## 2024-07-02 DIAGNOSIS — H35.033: ICD-10-CM

## 2024-07-02 DIAGNOSIS — H25.12: ICD-10-CM

## 2024-07-02 DIAGNOSIS — H17.89: ICD-10-CM

## 2024-07-02 DIAGNOSIS — H04.123: ICD-10-CM

## 2024-07-02 DIAGNOSIS — E11.3393: ICD-10-CM

## 2024-07-02 PROCEDURE — 92014 COMPRE OPH EXAM EST PT 1/>: CPT | Performed by: OPHTHALMOLOGY

## 2024-07-02 PROCEDURE — 92025 CPTRIZED CORNEAL TOPOGRAPHY: CPT | Performed by: OPHTHALMOLOGY

## 2024-07-02 PROCEDURE — 92250 FUNDUS PHOTOGRAPHY W/I&R: CPT | Performed by: OPHTHALMOLOGY

## 2024-07-02 PROCEDURE — 92083 EXTENDED VISUAL FIELD XM: CPT | Performed by: OPHTHALMOLOGY

## 2024-07-02 ASSESSMENT — CONFRONTATIONAL VISUAL FIELD TEST (CVF)
OS_FINDINGS: FULL
OD_FINDINGS: FULL

## 2024-11-25 ENCOUNTER — OFFICE (OUTPATIENT)
Dept: URBAN - METROPOLITAN AREA CLINIC 112 | Facility: CLINIC | Age: 69
Setting detail: OPHTHALMOLOGY
End: 2024-11-25
Payer: MEDICARE

## 2024-11-25 DIAGNOSIS — H40.1131: ICD-10-CM

## 2024-11-25 DIAGNOSIS — H17.89: ICD-10-CM

## 2024-11-25 DIAGNOSIS — E11.3393: ICD-10-CM

## 2024-11-25 DIAGNOSIS — Z96.1: ICD-10-CM

## 2024-11-25 DIAGNOSIS — H25.12: ICD-10-CM

## 2024-11-25 DIAGNOSIS — H35.033: ICD-10-CM

## 2024-11-25 DIAGNOSIS — H04.123: ICD-10-CM

## 2024-11-25 PROCEDURE — 92133 CPTRZD OPH DX IMG PST SGM ON: CPT | Performed by: OPHTHALMOLOGY

## 2024-11-25 PROCEDURE — 92014 COMPRE OPH EXAM EST PT 1/>: CPT | Performed by: OPHTHALMOLOGY

## 2024-11-25 ASSESSMENT — CONFRONTATIONAL VISUAL FIELD TEST (CVF)
OD_FINDINGS: FULL
OS_FINDINGS: FULL

## 2024-11-25 ASSESSMENT — KERATOMETRY
OS_AXISANGLE_DEGREES: 127
OD_K2POWER_DIOPTERS: 41.75
OS_K1POWER_DIOPTERS: 44.00
OD_K1POWER_DIOPTERS: 41.00
METHOD_AUTO_MANUAL: AUTO
OS_K2POWER_DIOPTERS: 44.75
OD_AXISANGLE_DEGREES: 019

## 2024-11-25 ASSESSMENT — PACHYMETRY
OS_CT_UM: 534
OS_CT_CORRECTION: 1
OD_CT_UM: 500
OD_CT_CORRECTION: 3

## 2024-11-25 ASSESSMENT — REFRACTION_AUTOREFRACTION
OS_CYLINDER: -1.50
OS_AXIS: 058
OS_SPHERE: +0.75
OD_CYLINDER: -1.00
OD_AXIS: 059
OD_SPHERE: +3.50

## 2024-11-25 ASSESSMENT — CORNEAL SURGICAL SCARRING: OD_SCARRING: ANTERIOR

## 2024-11-25 ASSESSMENT — VISUAL ACUITY
OD_BCVA: 20/25
OS_BCVA: 20/40+1

## 2024-11-25 ASSESSMENT — TONOMETRY
OS_IOP_MMHG: 16
OD_IOP_MMHG: 17
OD_IOP_MMHG: 16

## 2024-12-03 ENCOUNTER — OFFICE (OUTPATIENT)
Dept: URBAN - METROPOLITAN AREA CLINIC 94 | Facility: CLINIC | Age: 69
Setting detail: OPHTHALMOLOGY
End: 2024-12-03
Payer: MEDICARE

## 2024-12-03 DIAGNOSIS — E11.3393: ICD-10-CM

## 2024-12-03 DIAGNOSIS — H35.033: ICD-10-CM

## 2024-12-03 PROCEDURE — 92134 CPTRZ OPH DX IMG PST SGM RTA: CPT | Performed by: OPHTHALMOLOGY

## 2024-12-03 PROCEDURE — 92012 INTRM OPH EXAM EST PATIENT: CPT | Performed by: OPHTHALMOLOGY

## 2024-12-03 PROCEDURE — 92235 FLUORESCEIN ANGRPH MLTIFRAME: CPT | Performed by: OPHTHALMOLOGY

## 2024-12-03 ASSESSMENT — KERATOMETRY
OD_K1POWER_DIOPTERS: 41.00
OD_K2POWER_DIOPTERS: 41.75
OD_AXISANGLE_DEGREES: 019
OS_AXISANGLE_DEGREES: 127
OS_K1POWER_DIOPTERS: 44.00
METHOD_AUTO_MANUAL: AUTO
OS_K2POWER_DIOPTERS: 44.75

## 2024-12-03 ASSESSMENT — REFRACTION_AUTOREFRACTION
OD_AXIS: 059
OD_CYLINDER: -1.00
OS_CYLINDER: -1.50
OD_SPHERE: +3.50
OS_SPHERE: +0.75
OS_AXIS: 058

## 2024-12-03 ASSESSMENT — PACHYMETRY
OS_CT_CORRECTION: 1
OS_CT_UM: 534
OD_CT_CORRECTION: 3
OD_CT_UM: 500

## 2024-12-03 ASSESSMENT — CONFRONTATIONAL VISUAL FIELD TEST (CVF)
OD_FINDINGS: FULL
OS_FINDINGS: FULL

## 2024-12-03 ASSESSMENT — VISUAL ACUITY
OS_BCVA: 20/50
OD_BCVA: 20/40

## 2024-12-03 ASSESSMENT — TONOMETRY
OD_IOP_MMHG: 16
OS_IOP_MMHG: 14

## 2024-12-03 ASSESSMENT — CORNEAL SURGICAL SCARRING: OD_SCARRING: ANTERIOR

## 2025-05-22 ENCOUNTER — OFFICE (OUTPATIENT)
Dept: URBAN - METROPOLITAN AREA CLINIC 112 | Facility: CLINIC | Age: 70
Setting detail: OPHTHALMOLOGY
End: 2025-05-22
Payer: MEDICARE

## 2025-05-22 DIAGNOSIS — H04.123: ICD-10-CM

## 2025-05-22 DIAGNOSIS — H40.1131: ICD-10-CM

## 2025-05-22 DIAGNOSIS — H25.12: ICD-10-CM

## 2025-05-22 PROCEDURE — 99213 OFFICE O/P EST LOW 20 MIN: CPT | Performed by: OPHTHALMOLOGY

## 2025-05-22 ASSESSMENT — CONFRONTATIONAL VISUAL FIELD TEST (CVF)
OS_FINDINGS: FULL
OD_FINDINGS: FULL

## 2025-05-22 ASSESSMENT — PACHYMETRY
OS_CT_UM: 534
OS_CT_CORRECTION: 1
OD_CT_CORRECTION: 3
OD_CT_UM: 500

## 2025-05-22 ASSESSMENT — REFRACTION_AUTOREFRACTION
OD_CYLINDER: -0.50
OD_SPHERE: +3.75
OS_CYLINDER: -1.75
OS_SPHERE: +0.75
OS_AXIS: 063
OD_AXIS: 059

## 2025-05-22 ASSESSMENT — VISUAL ACUITY
OS_BCVA: 20/40-1
OD_BCVA: 20/40-1

## 2025-05-22 ASSESSMENT — CORNEAL SURGICAL SCARRING: OD_SCARRING: ANTERIOR

## 2025-05-22 ASSESSMENT — TONOMETRY
OD_IOP_MMHG: 13
OS_IOP_MMHG: 21
OD_IOP_MMHG: 15

## 2025-05-22 ASSESSMENT — KERATOMETRY
OS_K2POWER_DIOPTERS: 44.75
OD_AXISANGLE_DEGREES: 017
METHOD_AUTO_MANUAL: AUTO
OS_AXISANGLE_DEGREES: 141
OS_K1POWER_DIOPTERS: 43.75
OD_K2POWER_DIOPTERS: 41.75
OD_K1POWER_DIOPTERS: 41.00

## 2025-07-03 ENCOUNTER — OFFICE (OUTPATIENT)
Dept: URBAN - METROPOLITAN AREA CLINIC 94 | Facility: CLINIC | Age: 70
Setting detail: OPHTHALMOLOGY
End: 2025-07-03
Payer: MEDICARE

## 2025-07-03 DIAGNOSIS — H25.12: ICD-10-CM

## 2025-07-03 DIAGNOSIS — H40.1131: ICD-10-CM

## 2025-07-03 DIAGNOSIS — H04.123: ICD-10-CM

## 2025-07-03 DIAGNOSIS — H40.1121: ICD-10-CM

## 2025-07-03 PROCEDURE — 65855 TRABECULOPLASTY LASER SURG: CPT | Mod: LT | Performed by: OPHTHALMOLOGY

## 2025-07-03 PROCEDURE — 92133 CPTRZD OPH DX IMG PST SGM ON: CPT | Performed by: OPHTHALMOLOGY

## 2025-07-03 PROCEDURE — 99213 OFFICE O/P EST LOW 20 MIN: CPT | Mod: 25 | Performed by: OPHTHALMOLOGY

## 2025-07-03 PROCEDURE — 92083 EXTENDED VISUAL FIELD XM: CPT | Performed by: OPHTHALMOLOGY

## 2025-07-03 ASSESSMENT — CONFRONTATIONAL VISUAL FIELD TEST (CVF)
OS_FINDINGS: FULL
OD_FINDINGS: FULL

## 2025-07-03 ASSESSMENT — VISUAL ACUITY
OS_BCVA: 20/30-1
OD_BCVA: 20/25-1

## 2025-07-03 ASSESSMENT — TONOMETRY
OS_IOP_MMHG: 21
OD_IOP_MMHG: 15

## 2025-07-03 ASSESSMENT — KERATOMETRY
OD_K2POWER_DIOPTERS: 42.00
OS_K1POWER_DIOPTERS: 44.00
METHOD_AUTO_MANUAL: AUTO
OD_K1POWER_DIOPTERS: 41.50
OS_AXISANGLE_DEGREES: 138
OS_K2POWER_DIOPTERS: 44.75
OD_AXISANGLE_DEGREES: 055

## 2025-07-03 ASSESSMENT — REFRACTION_AUTOREFRACTION
OD_SPHERE: +4.00
OD_CYLINDER: -0.50
OS_CYLINDER: -1.75
OS_AXIS: 065
OD_AXIS: 147
OS_SPHERE: +1.00

## 2025-07-03 ASSESSMENT — PACHYMETRY
OS_CT_CORRECTION: 1
OS_CT_UM: 534
OD_CT_UM: 500
OD_CT_CORRECTION: 3

## 2025-07-03 ASSESSMENT — CORNEAL SURGICAL SCARRING: OD_SCARRING: ANTERIOR

## 2025-07-16 ENCOUNTER — ASC (OUTPATIENT)
Dept: URBAN - METROPOLITAN AREA SURGERY 8 | Facility: SURGERY | Age: 70
Setting detail: OPHTHALMOLOGY
End: 2025-07-16
Payer: MEDICARE

## 2025-07-16 DIAGNOSIS — H40.1111: ICD-10-CM

## 2025-07-16 PROCEDURE — 65855 TRABECULOPLASTY LASER SURG: CPT | Mod: RT | Performed by: OPHTHALMOLOGY

## 2025-07-16 ASSESSMENT — REFRACTION_AUTOREFRACTION
OS_AXIS: 065
OD_SPHERE: +4.00
OS_CYLINDER: -1.75
OD_AXIS: 147
OD_CYLINDER: -0.50
OS_SPHERE: +1.00

## 2025-07-16 ASSESSMENT — VISUAL ACUITY
OD_BCVA: 20/25-1
OS_BCVA: 20/30-1

## 2025-07-16 ASSESSMENT — KERATOMETRY
OS_K1POWER_DIOPTERS: 44.00
OS_K2POWER_DIOPTERS: 44.75
OS_AXISANGLE_DEGREES: 138
OD_K2POWER_DIOPTERS: 42.00
OD_AXISANGLE_DEGREES: 055
OD_K1POWER_DIOPTERS: 41.50
METHOD_AUTO_MANUAL: AUTO

## 2025-07-30 ENCOUNTER — OFFICE (OUTPATIENT)
Dept: URBAN - METROPOLITAN AREA CLINIC 94 | Facility: CLINIC | Age: 70
Setting detail: OPHTHALMOLOGY
End: 2025-07-30
Payer: MEDICARE

## 2025-07-30 DIAGNOSIS — H35.033: ICD-10-CM

## 2025-07-30 DIAGNOSIS — E11.3393: ICD-10-CM

## 2025-07-30 PROBLEM — H40.1121 POAG; RIGHT EYE MILD, LEFT EYE MILD: Status: ACTIVE | Noted: 2025-07-03

## 2025-07-30 PROBLEM — H40.1111 POAG; RIGHT EYE MILD, LEFT EYE MILD: Status: ACTIVE | Noted: 2025-07-03

## 2025-07-30 PROCEDURE — 92235 FLUORESCEIN ANGRPH MLTIFRAME: CPT | Performed by: OPHTHALMOLOGY

## 2025-07-30 PROCEDURE — 99213 OFFICE O/P EST LOW 20 MIN: CPT | Performed by: OPHTHALMOLOGY

## 2025-07-30 PROCEDURE — 92134 CPTRZ OPH DX IMG PST SGM RTA: CPT | Performed by: OPHTHALMOLOGY

## 2025-07-30 ASSESSMENT — KERATOMETRY
METHOD_AUTO_MANUAL: AUTO
OS_AXISANGLE_DEGREES: 138
OS_K2POWER_DIOPTERS: 44.75
OS_K1POWER_DIOPTERS: 44.00
OD_AXISANGLE_DEGREES: 055
OD_K1POWER_DIOPTERS: 41.50
OD_K2POWER_DIOPTERS: 42.00

## 2025-07-30 ASSESSMENT — REFRACTION_AUTOREFRACTION
OD_AXIS: 147
OS_AXIS: 065
OS_CYLINDER: -1.75
OD_CYLINDER: -0.50
OS_SPHERE: +1.00
OD_SPHERE: +4.00

## 2025-07-30 ASSESSMENT — TONOMETRY
OS_IOP_MMHG: 17
OD_IOP_MMHG: 11

## 2025-07-30 ASSESSMENT — CORNEAL SURGICAL SCARRING: OD_SCARRING: ANTERIOR

## 2025-07-30 ASSESSMENT — PACHYMETRY
OD_CT_UM: 500
OS_CT_UM: 534
OS_CT_CORRECTION: 1
OD_CT_CORRECTION: 3

## 2025-07-30 ASSESSMENT — VISUAL ACUITY
OD_BCVA: 20/25
OS_BCVA: 20/30

## 2025-07-30 ASSESSMENT — CONFRONTATIONAL VISUAL FIELD TEST (CVF)
OD_FINDINGS: FULL
OS_FINDINGS: FULL